# Patient Record
Sex: FEMALE | Race: OTHER | NOT HISPANIC OR LATINO | Employment: FULL TIME | URBAN - METROPOLITAN AREA
[De-identification: names, ages, dates, MRNs, and addresses within clinical notes are randomized per-mention and may not be internally consistent; named-entity substitution may affect disease eponyms.]

---

## 2024-04-02 ENCOUNTER — OFFICE VISIT (OUTPATIENT)
Dept: URGENT CARE | Facility: CLINIC | Age: 54
End: 2024-04-02

## 2024-04-02 VITALS
SYSTOLIC BLOOD PRESSURE: 132 MMHG | HEART RATE: 73 BPM | TEMPERATURE: 98 F | RESPIRATION RATE: 16 BRPM | HEIGHT: 61 IN | BODY MASS INDEX: 21.14 KG/M2 | DIASTOLIC BLOOD PRESSURE: 73 MMHG | WEIGHT: 112 LBS | OXYGEN SATURATION: 100 %

## 2024-04-02 DIAGNOSIS — K29.00 ACUTE GASTRITIS WITHOUT HEMORRHAGE, UNSPECIFIED GASTRITIS TYPE: Primary | ICD-10-CM

## 2024-04-02 PROCEDURE — 99214 OFFICE O/P EST MOD 30 MIN: CPT | Mod: TIER,S$GLB,, | Performed by: PHYSICIAN ASSISTANT

## 2024-04-02 RX ORDER — FAMOTIDINE 20 MG/1
20 TABLET, FILM COATED ORAL 2 TIMES DAILY
Qty: 30 TABLET | Refills: 0 | Status: SHIPPED | OUTPATIENT
Start: 2024-04-02 | End: 2024-04-17

## 2024-04-02 RX ORDER — ONDANSETRON 4 MG/1
4 TABLET, ORALLY DISINTEGRATING ORAL EVERY 6 HOURS PRN
Qty: 10 TABLET | Refills: 0 | Status: SHIPPED | OUTPATIENT
Start: 2024-04-02

## 2024-04-02 RX ORDER — DICYCLOMINE HYDROCHLORIDE 20 MG/1
20 TABLET ORAL 3 TIMES DAILY
Qty: 20 TABLET | Refills: 0 | Status: SHIPPED | OUTPATIENT
Start: 2024-04-02

## 2024-04-02 NOTE — PROGRESS NOTES
"Subjective:      Patient ID: Violet Latham is a 54 y.o. female.    Vitals:  height is 5' 1" (1.549 m) and weight is 50.8 kg (112 lb). Her oral temperature is 98.4 °F (36.9 °C). Her blood pressure is 132/73 and her pulse is 73. Her respiration is 16 and oxygen saturation is 100%.     Chief Complaint: Abdominal Pain    Patient provider note starts here:  Patient presents to the clinic with complaints of epigastric abdominal discomfort with associated nausea, vomiting, diarrhea for the past 2-3 days.  Reports taking some Pepto-Bismol without any significant relief.  Denies any abdominal pain in 1 specific region.  No fevers.  No known ill contacts.  No recent travel.  Patient has not had any emesis today.     Abdominal Pain  This is a new problem. The current episode started in the past 7 days (3/28). The onset quality is gradual. The problem has been unchanged. The pain is at a severity of 4/10. The pain is mild. The quality of the pain is aching. Associated symptoms include diarrhea, nausea and vomiting. Pertinent negatives include no dysuria, fever or frequency. Associated symptoms comments: Vomiting acid like. Nothing aggravates the pain. She has tried acetaminophen for the symptoms.       Constitution: Negative for fever.   Neck: Negative for neck pain.   Cardiovascular:  Negative for chest pain, palpitations and sob on exertion.   Respiratory:  Negative for chest tightness and wheezing.    Gastrointestinal:  Positive for abdominal pain, nausea, vomiting and diarrhea.   Genitourinary:  Negative for dysuria, frequency and urgency.   Skin:  Negative for color change and wound.   Neurological:  Negative for numbness and tingling.      Objective:     Physical Exam   Constitutional: She is oriented to person, place, and time. She appears well-developed.  Non-toxic appearance. She does not appear ill. No distress.   HENT:   Head: Normocephalic and atraumatic.   Ears:   Right Ear: External ear normal.   Left Ear: External " ear normal.   Nose: Nose normal.   Mouth/Throat: Mucous membranes are normal.   Eyes: Conjunctivae and lids are normal.   Neck: Trachea normal. Neck supple.   Cardiovascular: Normal rate, regular rhythm and normal heart sounds.   Pulmonary/Chest: Effort normal and breath sounds normal. No respiratory distress.   Abdominal: Normal appearance and bowel sounds are normal. She exhibits no distension and no mass. Soft. There is abdominal tenderness (There is mild TTP to the epigastric region. There is no rebounf or guarding noted.). There is no rebound, no guarding, no left CVA tenderness and no right CVA tenderness.   Musculoskeletal: Normal range of motion.         General: Normal range of motion.   Neurological: She is alert and oriented to person, place, and time. She has normal strength.   Skin: Skin is warm, dry, intact, not diaphoretic and not pale.   Psychiatric: Her speech is normal and behavior is normal. Judgment and thought content normal.   Nursing note and vitals reviewed.      Assessment:     1. Acute gastritis without hemorrhage, unspecified gastritis type        Plan:       Acute gastritis without hemorrhage, unspecified gastritis type  -     famotidine (PEPCID) 20 MG tablet; Take 1 tablet (20 mg total) by mouth 2 (two) times daily. for 15 days  Dispense: 30 tablet; Refill: 0  -     ondansetron (ZOFRAN-ODT) 4 MG TbDL; Take 1 tablet (4 mg total) by mouth every 6 (six) hours as needed (NAUSEA).  Dispense: 10 tablet; Refill: 0  -     dicyclomine (BENTYL) 20 mg tablet; Take 1 tablet (20 mg total) by mouth 3 (three) times daily.  Dispense: 20 tablet; Refill: 0          Medical Decision Making:   History:   Old Medical Records: I decided to obtain old medical records.  Urgent Care Management:  A. Problem List:   -Acute: Acute gastritis    -Chronic: None  B. Differential diagnosis: aortic dissection, mesenteric ischemia, perforated bowel, SBO, ileus, appendicitis, cholecystitis, diverticulitis, nephrolithiasis,  pancreatitis, gastroenteritis, colitis, biliary colic, GERD, PUD, constipation, UTI  C. Diagnostic Testing Ordered: None  D. Diagnostic Testing Considered: None  E. Independent Historians: None  F. Urgent Care Midlevel Independent Results Interpretation:   G. Radiology:  H. Review of Previous Medical Records: No previous medical records on file. Patient denies PMH or daily medications.   I. Home Medications Reviewed  J. Social Determinants of Health considered  K. Medical Decision Making and Disposition: Patient presents with complaints of epigastric abdominal discomfort and N/V/D for the past few days. Denies fevers. No urinary symptoms. I have prescribed Pepcid, Bentyl and Zofran and encouraged close follow-up with PCP. I do not suspect an acute occult surgical process at this time. ED precautions discussed, she verbalized understanding and agreed with plan.          Patient Instructions   Thank you for choosing [facility]!     Our goal in the Urgent Care is to always provide outstanding medical care. You may receive a survey by mail or e-mail in the next week regarding your experience today. We would greatly appreciate you completing and returning the survey. Your feedback provides us with a way to recognize our staff who provide very good care, and it helps us learn how to improve when your experience was below our aspiration of excellence.       We appreciate you trusting us with your medical care. We hope you feel better soon. We will be happy to take care of you for all of your future medical needs.    You must understand that you've received an Urgent Care treatment only and that you may be released before all your medical problems are known or treated. You, the patient, will arrange for follow up care as instructed.      Follow up with your PCP or specialty clinic as instructed in the next 2-3 days if not improved or as needed. You can call [phone] to schedule an appointment with appropriate  provider.      If you condition worsens, we recommend that you receive another evaluation at the emergency room immediately or contact your primary medical clinic's after hours call service to discuss your concerns.      Please return here or go to the Emergency Department for any concerns or worsening condition.

## 2024-04-02 NOTE — PATIENT INSTRUCTIONS

## 2024-10-22 ENCOUNTER — OFFICE VISIT (OUTPATIENT)
Dept: URGENT CARE | Facility: CLINIC | Age: 54
End: 2024-10-22

## 2024-10-22 VITALS
OXYGEN SATURATION: 100 % | BODY MASS INDEX: 20.75 KG/M2 | HEART RATE: 82 BPM | HEIGHT: 61 IN | TEMPERATURE: 98 F | RESPIRATION RATE: 14 BRPM | SYSTOLIC BLOOD PRESSURE: 134 MMHG | DIASTOLIC BLOOD PRESSURE: 90 MMHG | WEIGHT: 109.88 LBS

## 2024-10-22 DIAGNOSIS — R06.02 SHORTNESS OF BREATH: Primary | ICD-10-CM

## 2024-10-22 LAB
OHS QRS DURATION: 84 MS
OHS QTC CALCULATION: 427 MS

## 2024-10-22 PROCEDURE — 71046 X-RAY EXAM CHEST 2 VIEWS: CPT | Mod: TIER,S$GLB,, | Performed by: STUDENT IN AN ORGANIZED HEALTH CARE EDUCATION/TRAINING PROGRAM

## 2024-10-22 RX ORDER — ALBUTEROL SULFATE 90 UG/1
2 INHALANT RESPIRATORY (INHALATION) EVERY 6 HOURS PRN
Qty: 6.7 G | Refills: 0 | Status: SHIPPED | OUTPATIENT
Start: 2024-10-22

## 2024-10-22 NOTE — PATIENT INSTRUCTIONS

## 2024-10-22 NOTE — PROGRESS NOTES
"Subjective:      Patient ID: Violet Latham is a 54 y.o. female.    Vitals:  height is 5' 1" (1.549 m) and weight is 49.9 kg (109 lb 14.4 oz). Her oral temperature is 98.3 °F (36.8 °C). Her blood pressure is 134/90 (abnormal) and her pulse is 82. Her respiration is 14 and oxygen saturation is 100%.     Chief Complaint: Shortness of Breath    Pt complains of SOB and pressure in her chest that started 10 days ago. Pt denies cough. She thinks it may be due to taking Semaglutide. She said she stopped taking it about 2 weeks ago and symptoms have improved some. She has also been using an inhaler.     Patient provider note starts here:    Patient presents to the clinic with complaints of shortness breath which has worsened with exertion and a pressure in her chest which started about 10 days ago.  She does have a history of asthma but denies this being consistent with asthma as she normally presents with a cough and denies cough at this time.  She is taking semaglutide and unsure if this is related in the cause for his shortness of breath.  She has been using her albuterol inhaler a few times per week and reports that she does get some improvement with it.  Denies lower extremity edema. She does not have a PCP.     Shortness of Breath  This is a new problem. Episode onset: 2 weeks ago. The problem occurs constantly. The problem has been gradually improving. Associated symptoms include chest pain ("pressure"). Pertinent negatives include no abdominal pain, fever, leg swelling, neck pain, vomiting or wheezing. The symptoms are aggravated by any activity. She has tried OTC inhalers for the symptoms. The treatment provided mild relief. Her past medical history is significant for asthma.       Constitution: Negative for fever.   Neck: Negative for neck pain.   Cardiovascular:  Positive for chest pain ("pressure") and sob on exertion. Negative for leg swelling and palpitations.   Respiratory:  Positive for shortness of breath and " asthma. Negative for chest tightness and wheezing.    Gastrointestinal:  Negative for abdominal pain, vomiting and diarrhea.   Skin:  Negative for color change and wound.   Allergic/Immunologic: Positive for asthma.   Neurological:  Negative for numbness and tingling.      Objective:     Physical Exam   Constitutional: She is oriented to person, place, and time. She appears well-developed. She is cooperative.  Non-toxic appearance. She does not appear ill. No distress.   HENT:   Head: Normocephalic and atraumatic.   Ears:   Right Ear: Hearing, tympanic membrane, external ear and ear canal normal.   Left Ear: Hearing, tympanic membrane, external ear and ear canal normal.   Nose: Nose normal. No mucosal edema, rhinorrhea, nasal deformity or congestion. No epistaxis. Right sinus exhibits no maxillary sinus tenderness and no frontal sinus tenderness. Left sinus exhibits no maxillary sinus tenderness and no frontal sinus tenderness.   Mouth/Throat: Uvula is midline, oropharynx is clear and moist and mucous membranes are normal. No trismus in the jaw. Normal dentition. No uvula swelling. No oropharyngeal exudate, posterior oropharyngeal edema or posterior oropharyngeal erythema.   Eyes: Conjunctivae and lids are normal. No scleral icterus.   Neck: Trachea normal and phonation normal. Neck supple. No edema present. No erythema present. No neck rigidity present.   Cardiovascular: Normal rate, regular rhythm, normal heart sounds and normal pulses.   Pulmonary/Chest: Effort normal and breath sounds normal. No respiratory distress. She has no decreased breath sounds. She has no wheezes. She has no rhonchi.   Abdominal: Normal appearance.   Musculoskeletal: Normal range of motion.         General: No deformity. Normal range of motion.      Right lower leg: No edema.      Left lower leg: No edema.   Neurological: She is alert and oriented to person, place, and time. She exhibits normal muscle tone. Coordination normal.   Skin:  Skin is warm, dry, intact, not diaphoretic and not pale.   Psychiatric: Her speech is normal and behavior is normal. Judgment and thought content normal.   Nursing note and vitals reviewed.      Assessment:     1. Shortness of breath      XR CHEST PA AND LATERAL  Result Date: 10/22/2024  EXAMINATION: XR CHEST PA AND LATERAL CLINICAL HISTORY: Shortness of breath TECHNIQUE: PA and lateral views of the chest were performed. COMPARISON: None FINDINGS: Cardiomediastinal silhouette is normal in size.  Mediastinal structures are midline. Lungs are symmetrically expanded.  No focal consolidation, pleural effusion, or pneumothorax. Osseous structures demonstrate no evidence for acute fracture or osseous destructive lesion.  Mild degenerative change. Soft tissues are unremarkable.   No acute intrathoracic abnormality. Electronically signed by: Buddy Love Date: 10/22/2024 Time: 09:28     Plan:       Shortness of breath  -     XR CHEST PA AND LATERAL; Future; Expected date: 10/22/2024  -     IN OFFICE EKG 12-LEAD (to George)  -     Ambulatory referral/consult to Internal Medicine  -     albuterol (PROVENTIL HFA) 90 mcg/actuation inhaler; Inhale 2 puffs into the lungs every 6 (six) hours as needed for Shortness of Breath or Wheezing. Rescue  Dispense: 6.7 g; Refill: 0          Medical Decision Making:   History:   Old Medical Records: I decided to obtain old medical records.  Independently Interpreted Test(s):   I have ordered and independently interpreted X-rays - see summary below.  I have ordered and independently interpreted EKG Reading(s) - see summary below  Clinical Tests:   Radiological Study: Ordered and Reviewed  Medical Tests: Ordered and Reviewed  Urgent Care Management:  A. Problem List:   -Acute: Shortness of breath    -Chronic: Asthma   B. Differential diagnosis: ACS/MI, PE, aortic dissection, pneumothorax, cardiac tamponade, pericarditis/myocarditis, pneumonia, infection/abscess, lung mass,  costochondritis/pleurisy, GERD, biliary disease, pancreatitis, asthma exacerbation   C. Diagnostic Testing Ordered: CXR, ECG   D. Diagnostic Testing Considered: None  E. Independent Historians: None  F. Urgent Care Midlevel Independent Results Interpretation: CXR reviewed and interpreted by me- no acute findings, ECG shows normal sinus rhythm at 76 bpm. No ST elevation, no previous ECG on file   G. Radiology: See radiology report above   H. Review of Previous Medical Records:   I. Home Medications Reviewed  J. Social Determinants of Health considered  K. Medical Decision Making and Disposition: Patient presents with complaints of having chest pressure and SOB with exertion x10 days. On exam, she is afebrile and nontoxic appearing. Lungs CTAB, vitals are stable. CXR appears normal. ECG without acute findings. Advised outpatient follow-up and ED precautions. She verbalized understanding and agreed with plan.     Other:   I have discussed this case with another health care provider.       <> Summary of the Discussion: Discussed this case with Dr. Castelan who reviewed patient's chart and agreed with outpatient follow-up.        Patient Instructions   Thank you for choosing [facility]!     Our goal in the Urgent Care is to always provide outstanding medical care. You may receive a survey by mail or e-mail in the next week regarding your experience today. We would greatly appreciate you completing and returning the survey. Your feedback provides us with a way to recognize our staff who provide very good care, and it helps us learn how to improve when your experience was below our aspiration of excellence.       We appreciate you trusting us with your medical care. We hope you feel better soon. We will be happy to take care of you for all of your future medical needs.    You must understand that you've received an Urgent Care treatment only and that you may be released before all your medical problems are known or  treated. You, the patient, will arrange for follow up care as instructed.      Follow up with your PCP or specialty clinic as instructed in the next 2-3 days if not improved or as needed. You can call [phone] to schedule an appointment with appropriate provider.      If you condition worsens, we recommend that you receive another evaluation at the emergency room immediately or contact your primary medical clinic's after hours call service to discuss your concerns.      Please return here or go to the Emergency Department for any concerns or worsening condition.